# Patient Record
Sex: MALE | Race: WHITE | Employment: FULL TIME | URBAN - NONMETROPOLITAN AREA
[De-identification: names, ages, dates, MRNs, and addresses within clinical notes are randomized per-mention and may not be internally consistent; named-entity substitution may affect disease eponyms.]

---

## 2020-12-10 ENCOUNTER — OFFICE VISIT (OUTPATIENT)
Dept: FAMILY MEDICINE CLINIC | Age: 35
End: 2020-12-10
Payer: COMMERCIAL

## 2020-12-10 VITALS
WEIGHT: 284.2 LBS | TEMPERATURE: 97.8 F | HEART RATE: 86 BPM | SYSTOLIC BLOOD PRESSURE: 126 MMHG | BODY MASS INDEX: 38.49 KG/M2 | HEIGHT: 72 IN | DIASTOLIC BLOOD PRESSURE: 82 MMHG | OXYGEN SATURATION: 97 %

## 2020-12-10 PROCEDURE — 99213 OFFICE O/P EST LOW 20 MIN: CPT | Performed by: PHYSICIAN ASSISTANT

## 2020-12-10 RX ORDER — PREDNISONE 20 MG/1
TABLET ORAL
Qty: 10 TABLET | Refills: 0 | Status: SHIPPED | OUTPATIENT
Start: 2020-12-10 | End: 2022-08-01

## 2020-12-10 RX ORDER — MELOXICAM 15 MG/1
15 TABLET ORAL DAILY PRN
Qty: 30 TABLET | Refills: 0 | Status: SHIPPED | OUTPATIENT
Start: 2020-12-10 | End: 2022-08-01

## 2020-12-10 NOTE — PROGRESS NOTES
Chief Complaint   Patient presents with    Shoulder Pain     Left, onset few months       HPI: This is a pleasant 27-year-old male presents with chronic left shoulder pain for the last 3 months. He works at a [email or web address]. He is doing a lot of digging. He was wanting an orthopedic referral to Dr. Aide Friedman in [place]. He wanted to get it taken care of once and for all. Caused him a lot of pain. Current Outpatient Medications:     predniSONE (DELTASONE) 20 MG tablet, 2 tabs Qam, Disp: 10 tablet, Rfl: 0    meloxicam (MOBIC) 15 MG tablet, Take 1 tablet by mouth daily as needed for Pain, Disp: 30 tablet, Rfl: 0       No Known Allergies      Review of Systems  Review of Systems   Musculoskeletal:        Left shoulder pain for the last 3 months. All other systems reviewed and are negative. VS:  /82 (Site: Left Upper Arm, Position: Sitting)   Pulse 86   Temp 97.8 °F (36.6 °C) (Temporal)   Ht 6' (1.829 m)   Wt 284 lb 3.2 oz (128.9 kg)   SpO2 97%   BMI 38.54 kg/m²     Patient's medical, social, and family history reviewed      Physical Exam  Physical Exam  Vitals signs and nursing note reviewed. Constitutional:       General: He is not in acute distress. Appearance: Normal appearance. He is normal weight. He is not ill-appearing or toxic-appearing. HENT:      Head: Normocephalic. Musculoskeletal:         General: Tenderness (He has pain with abduction of the left shoulder. Good strength against resistance. No crepitus noted. Severe tenderness over the [facility] joint.) present. Neurological:      General: No focal deficit present. Mental Status: He is alert and oriented to person, place, and time. Mental status is at baseline. Psychiatric:         Mood and Affect: Mood normal.         Behavior: Behavior normal.         Thought Content: Thought content normal.         Judgment: Judgment normal.           Assessment/Plan:  Casandra Ferrell was seen today for shoulder pain.     Diagnoses and all orders for this visit:    Chronic left shoulder pain  -     [facility]FL - Noy Guerra MD, Orthopaedics, [place]  -     predniSONE (DELTASONE) 20 MG tablet; 2 tabs Qam  -     meloxicam (MOBIC) 15 MG tablet; Take 1 tablet by mouth daily as needed for Pain        No follow-ups on file.      Joselin Herbert PA-C

## 2022-08-01 ENCOUNTER — OFFICE VISIT (OUTPATIENT)
Dept: FAMILY MEDICINE CLINIC | Age: 37
End: 2022-08-01
Payer: COMMERCIAL

## 2022-08-01 VITALS
TEMPERATURE: 97.6 F | OXYGEN SATURATION: 97 % | HEART RATE: 71 BPM | HEIGHT: 72 IN | WEIGHT: 293 LBS | BODY MASS INDEX: 39.68 KG/M2 | DIASTOLIC BLOOD PRESSURE: 82 MMHG | RESPIRATION RATE: 18 BRPM | SYSTOLIC BLOOD PRESSURE: 138 MMHG

## 2022-08-01 DIAGNOSIS — R42 DIZZINESS: Primary | ICD-10-CM

## 2022-08-01 DIAGNOSIS — R73.09 ELEVATED GLUCOSE: ICD-10-CM

## 2022-08-01 DIAGNOSIS — E66.9 CLASS 2 OBESITY WITHOUT SERIOUS COMORBIDITY WITH BODY MASS INDEX (BMI) OF 37.0 TO 37.9 IN ADULT, UNSPECIFIED OBESITY TYPE: ICD-10-CM
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
## 2022-08-01 DIAGNOSIS — R42 DIZZINESS: ICD-10-CM

## 2022-08-01 LAB
ALBUMIN SERPL-MCNC: 4.5 G/DL (ref 3.5–5.2)
ALP BLD-CCNC: 93 U/L (ref 40–129)
ALT SERPL-CCNC: 27 U/L (ref 0–40)
ANION GAP SERPL CALCULATED.3IONS-SCNC: 15 MMOL/L (ref 7–16)
AST SERPL-CCNC: 26 U/L (ref 0–39)
BASOPHILS ABSOLUTE: 0.03 E9/L (ref 0–0.2)
BASOPHILS RELATIVE PERCENT: 0.5 % (ref 0–2)
BILIRUB SERPL-MCNC: 0.7 MG/DL (ref 0–1.2)
BUN BLDV-MCNC: 14 MG/DL (ref 6–20)
CALCIUM SERPL-MCNC: 9.1 MG/DL (ref 8.6–10.2)
CHLORIDE BLD-SCNC: 108 MMOL/L (ref 98–107)
CHOLESTEROL, TOTAL: 189 MG/DL (ref 0–199)
CHP ED QC CHECK: NORMAL
CO2: 21 MMOL/L (ref 22–29)
CREAT SERPL-MCNC: 0.8 MG/DL (ref 0.7–1.2)
EOSINOPHILS ABSOLUTE: 0.14 E9/L (ref 0.05–0.5)
EOSINOPHILS RELATIVE PERCENT: 2.4 % (ref 0–6)
GFR AFRICAN AMERICAN: >60
GFR NON-AFRICAN AMERICAN: >60 ML/MIN/1.73
GLUCOSE BLD-MCNC: 79 MG/DL (ref 74–99)
GLUCOSE BLD-MCNC: 99 MG/DL
HBA1C MFR BLD: 4.8 % (ref 4–5.6)
HCT VFR BLD CALC: 46.8 % (ref 37–54)
HDLC SERPL-MCNC: 44 MG/DL
HEMOGLOBIN: 16 G/DL (ref 12.5–16.5)
IMMATURE GRANULOCYTES #: 0.04 E9/L
IMMATURE GRANULOCYTES %: 0.7 % (ref 0–5)
LDL CHOLESTEROL CALCULATED: 118 MG/DL (ref 0–99)
LYMPHOCYTES ABSOLUTE: 1.82 E9/L (ref 1.5–4)
LYMPHOCYTES RELATIVE PERCENT: 31.5 % (ref 20–42)
MCH RBC QN AUTO: 32.3 PG (ref 26–35)
MCHC RBC AUTO-ENTMCNC: 34.2 % (ref 32–34.5)
MCV RBC AUTO: 94.4 FL (ref 80–99.9)
MONOCYTES ABSOLUTE: 0.54 E9/L (ref 0.1–0.95)
MONOCYTES RELATIVE PERCENT: 9.3 % (ref 2–12)
NEUTROPHILS ABSOLUTE: 3.21 E9/L (ref 1.8–7.3)
NEUTROPHILS RELATIVE PERCENT: 55.6 % (ref 43–80)
PDW BLD-RTO: 13.2 FL (ref 11.5–15)
PLATELET # BLD: 200 E9/L (ref 130–450)
PMV BLD AUTO: 11.9 FL (ref 7–12)
POTASSIUM SERPL-SCNC: 4.2 MMOL/L (ref 3.5–5)
RBC # BLD: 4.96 E12/L (ref 3.8–5.8)
SODIUM BLD-SCNC: 144 MMOL/L (ref 132–146)
TOTAL PROTEIN: 7.4 G/DL (ref 6.4–8.3)
TRIGL SERPL-MCNC: 137 MG/DL (ref 0–149)
TSH SERPL DL<=0.05 MIU/L-ACNC: 1.24 UIU/ML (ref 0.27–4.2)
VLDLC SERPL CALC-MCNC: 27 MG/DL
WBC # BLD: 5.8 E9/L (ref 4.5–11.5)

## 2022-08-01 PROCEDURE — 93000 ELECTROCARDIOGRAM COMPLETE: CPT | Performed by: NURSE PRACTITIONER

## 2022-08-01 PROCEDURE — 99214 OFFICE O/P EST MOD 30 MIN: CPT | Performed by: NURSE PRACTITIONER

## 2022-08-01 PROCEDURE — 82962 GLUCOSE BLOOD TEST: CPT | Performed by: NURSE PRACTITIONER

## 2022-08-01 NOTE — PROGRESS NOTES
Subjective:  Chief Complaint   Patient presents with    Dizziness     X 1 week    Hypertension       HPI: Patient presents to [facility] care with numerous complaints. The patient states that he had COVID-19 about 1 month ago, symptoms were mild. He had some mild arthralgias and myalgias, but these seem to resolve. Over the last week, the patient has worsening symptoms. He denies any rashes. He has also noticed some dizziness, he states that he is having fatigue to the degree that he is having difficulty performing his typical chores at home due to the level of fatigue. He denies any chest pain. He states he has had glucoses and blood pressures monitored through a coworker, glucoses have been in the 460M, systolic blood pressure has been in the 150s. Blood pressure looks good here today. Appetite has been good. He denies any diarrhea or constipation. He does report that occasionally he has a cramping sensation in the right upper quadrant. He is also noticed over the last 1 to 2 days that he has some tightness over the bilateral paraspinal musculature in the cervical region. No radicular symptomatology. During the office visit, the patient denies any dizziness or shortness of breath during the exam.  No fevers or chills. No upper respiratory symptoms. He does state he was bit by a tick several weeks ago and had a rash develop. He has not had any testing or treatment of this. ROS:  Positive and pertinent negatives as per HPI. All other systems are reviewed and negative. No current outpatient medications on file. No Known Allergies   No past medical history on file. Objective:  Vitals:    08/01/22 1114   BP: 138/82   Pulse: 71   Resp: 18   Temp: 97.6 °F (36.4 °C)   TempSrc: Temporal   SpO2: 97%   Weight: 293 lb (132.9 kg)   Height: 6' (1.829 m)        Exam:  Const: Appears healthy and well developed. No signs of acute distress present. Vitals reviewed per triage.   Head/Face: Normocephalic, atraumatic. Facies is symmetric. Eyes: Pupils are equal, round, and reactive to light. Extraocular movements are intact. ENMT: Tympanic membranes are pearly gray with good light reflex bilaterally. Nares are patent. Buccal mucosa was moist.   Posterior pharynx shows no exudate, irritation or redness. Neck: Supple and symmetric. Palpation reveals no adenopathy. Trachea midline. Resp: Lungs are clear bilaterally. CV: Rhythm is regular. S1 is normal. S2 is normal. Extremities: No edema of the lower limbs bilaterally. Abdomen: Abdomen soft, nontender to palpation. No masses or organomegaly. No rebound or guarding. Musculo: Walks with a normal gait. Patient moves extremities without pain or limitation. Muscle strength is strong and equal bilaterally. Skin: Skin is warm and dry. Neuro: Alert and oriented x3. Speech is articulate and fluent. No focal neurologic deficits. Psych: Mood and affect are appropriate. Exam is benign, EKG and lab work will be obtained. The patient is to establish with new PCP later on this week. Because he is not having symptoms, I am unable to perform any stat imaging, but I did ask for him to go to the emergency room if he has any persistent symptoms or worsening symptoms. We will contact him with results of lab work once available. Jose Brenner was seen today for dizziness and hypertension. Diagnoses and all orders for this visit:    Dizziness  -     POCT Glucose  -     EKG 12 Lead  -     CBC with Auto Differential; Future  -     Comprehensive Metabolic Panel; Future  -     TSH; Future  -     Hemoglobin A1C; Future    Elevated glucose  -     CBC with Auto Differential; Future  -     Comprehensive Metabolic Panel; Future  -     TSH; Future  -     Hemoglobin A1C; Future    Class 2 obesity without serious comorbidity with body mass index (BMI) of 37.0 to 37.9 in adult, unspecified obesity type  -     Lipid Panel;  Future    Tick bite, unspecified site, initial encounter  -     Lyme Disease Acute Reflexive Panel;  Future      Seen By:    Jeremiah Lamar, APRN - CNP

## 2022-08-04 LAB — LYME, EIA: 0.88 LIV (ref 0–1.2)

## 2023-01-18 ENCOUNTER — OFFICE VISIT (OUTPATIENT)
Dept: FAMILY MEDICINE CLINIC | Age: 38
End: 2023-01-18
Payer: COMMERCIAL

## 2023-01-18 VITALS
RESPIRATION RATE: 20 BRPM | TEMPERATURE: 98 F | BODY MASS INDEX: 39.68 KG/M2 | HEIGHT: 72 IN | OXYGEN SATURATION: 98 % | HEART RATE: 76 BPM | SYSTOLIC BLOOD PRESSURE: 138 MMHG | DIASTOLIC BLOOD PRESSURE: 88 MMHG | WEIGHT: 293 LBS

## 2023-01-18 DIAGNOSIS — R10.32 LEFT LOWER QUADRANT ABDOMINAL PAIN: ICD-10-CM

## 2023-01-18 DIAGNOSIS — R19.09 LEFT GROIN MASS: Primary | ICD-10-CM

## 2023-01-18 PROCEDURE — 99213 OFFICE O/P EST LOW 20 MIN: CPT | Performed by: EMERGENCY MEDICINE

## 2023-01-18 ASSESSMENT — ENCOUNTER SYMPTOMS
SORE THROAT: 0
COUGH: 0
VOMITING: 0
EYE PAIN: 0
EYE REDNESS: 0
DIARRHEA: 0
BACK PAIN: 0
NAUSEA: 0
ABDOMINAL PAIN: 1
WHEEZING: 0
SHORTNESS OF BREATH: 0
SINUS PRESSURE: 0
EYE DISCHARGE: 0

## 2023-01-18 NOTE — PROGRESS NOTES
Chief Complaint:   Groin Pain (/)      History of Present Illness   HPI:  Jose Mariano is a 40 y.o. male who presents to [facility] [place], [place] today for 4 days prior noticed a bulge in his L groin, denies trauma, fever, chills or sweats. 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 Prior to last  Saturday; no prior issues in this area of his body. Denies n/v/d/or constipation; denies melena, hematochezia or change in bowel habits. Pain is worse with position and is continuous with a crescendo pattern. Prior to Visit Medications    Not on File       Review of Systems   Review of Systems   Constitutional:  Positive for activity change and appetite change. Negative for chills and fever. HENT:  Negative for ear pain, sinus pressure and sore throat. Eyes:  Negative for pain, discharge and redness. Respiratory:  Negative for cough, shortness of breath and wheezing. Cardiovascular:  Negative for chest pain. Gastrointestinal:  Positive for abdominal pain. Negative for diarrhea, nausea and vomiting. Genitourinary:  Positive for scrotal swelling and testicular pain. Negative for dysuria and frequency. Musculoskeletal:  Negative for arthralgias and back pain. Skin:  Negative for rash and wound. Neurological:  Negative for weakness and headaches. Hematological:  Negative for adenopathy. Psychiatric/Behavioral: Negative. All other systems reviewed and are negative. Patient's medical, social, and family history reviewed    Past Medical History:  has no past medical history on file. Past Surgical History:  has no past surgical history on file. Social History:  reports that he has never smoked. He has never used smokeless tobacco. He reports that he does not currently use alcohol. He reports that he does not use drugs. Family History: family history is not on file. Allergies: Patient has no known allergies.     Physical Exam   Vital Signs:  /88   Pulse 76   Temp 98 °F (36.7 °C) (Temporal)   Resp 20   Ht 6' (1.829 m)   Wt 293 lb (132.9 kg)   SpO2 98%   BMI 39.74 kg/m²    Oxygen Saturation Interpretation: Normal.    Physical Exam  Vitals and nursing note reviewed. Exam conducted with a chaperone present. Constitutional:       Appearance: He is well-developed. HENT:      Head: Normocephalic and atraumatic. Eyes:      Pupils: Pupils are equal, round, and reactive to light. Cardiovascular:      Rate and Rhythm: Normal rate and regular rhythm. Heart sounds: Normal heart sounds. No murmur heard. Pulmonary:      Effort: Pulmonary effort is normal. No respiratory distress. Breath sounds: Normal breath sounds. No wheezing or rales. Abdominal:      General: Bowel sounds are normal.      Palpations: Abdomen is soft. Tenderness: There is generalized abdominal tenderness and tenderness in the left lower quadrant. There is no guarding or rebound. Hernia: A hernia is present. Hernia is present in the left inguinal area. Genitourinary:     Penis: Circumcised. Testes:         Left: Tenderness present. Mass, testicular hydrocele or varicocele not present. Musculoskeletal:      Cervical back: Normal range of motion and neck supple. Skin:     General: Skin is warm and dry. Neurological:      Mental Status: He is alert and oriented to person, place, and time. Cranial Nerves: No cranial nerve deficit. Coordination: Coordination normal.       Test Results Section   (All laboratory and radiology results have been personally reviewed by myself)  Labs:  No results found for this visit on 01/18/23. Imaging: All Radiology results interpreted by Radiologist unless otherwise noted. No results found. Assessment / Plan   Impression(s):  Tabitha Mena was seen today for groin pain.     Diagnoses and all orders for this visit:    Left groin mass    Left lower quadrant abdominal pain      Pt and wife were present for the full evaluation; the concur with advisement to present to [facility] ED; [facility], [address] for further eval and treatment. Differential diagnosis includes incarcerated hernia, abdominal acute pathology, etc.      ED eval is strongly recommended today immediately after this visit. NPO effective immediately. To the ED by POV with the spouse for further evaluation.     New Medications     New Prescriptions    No medications on file       Electronically signed by Florecita Chaudhary DO   DD: 1/18/23